# Patient Record
Sex: FEMALE | Race: OTHER | Employment: OTHER | URBAN - METROPOLITAN AREA
[De-identification: names, ages, dates, MRNs, and addresses within clinical notes are randomized per-mention and may not be internally consistent; named-entity substitution may affect disease eponyms.]

---

## 2019-07-01 ENCOUNTER — NEW PATIENT COMPREHENSIVE (OUTPATIENT)
Dept: URBAN - METROPOLITAN AREA CLINIC 47 | Facility: CLINIC | Age: 54
End: 2019-07-01

## 2019-07-01 DIAGNOSIS — H25.13: ICD-10-CM

## 2019-07-01 DIAGNOSIS — H52.7: ICD-10-CM

## 2019-07-01 PROCEDURE — 92004 COMPRE OPH EXAM NEW PT 1/>: CPT

## 2019-07-01 PROCEDURE — 92015 DETERMINE REFRACTIVE STATE: CPT

## 2019-07-01 ASSESSMENT — VISUAL ACUITY
OD_SC: 20/200
OD_SC: >J12
OS_CC: J12
OD_CC: J12
OS_SC: 20/200
OS_SC: >J12
OS_CC: 20/30-1
OD_CC: 20/30

## 2019-07-01 ASSESSMENT — TONOMETRY
OD_IOP_MMHG: 15
OS_IOP_MMHG: 15

## 2021-08-16 NOTE — PATIENT DISCUSSION
Recommend Mini lower lift, post-tragel, SMAS (discussed risks and benefits of sx. ..). avoid cosmetic sx pt has cancer.

## 2021-08-16 NOTE — PATIENT DISCUSSION
Rx Restasis bid ou,  Reviewed with patient.  Samples given.  Patient can get Klarity C bid if no coverage by insurance for [facility] also.

## 2021-08-23 NOTE — PATIENT DISCUSSION
Rx Restasis bid ou,  Reviewed with patient.  Samples given.  Patient can get Klarity C bid if no coverage by insurance for [facility] also.

## 2022-02-02 NOTE — PATIENT DISCUSSION
Refilled Rx of  Restasis bid ou,  Reviewed with patient.  Samples given.  Patient can get Klarity C bid if no coverage by insurance for [facility] also.

## 2022-06-10 NOTE — PATIENT DISCUSSION
Refilled Rx of  Restasis bid ou,  Reviewed with patient.  Samples given.  Patient can get Klarity C bid if no coverage by insurance for [facility] also.

## 2022-08-29 NOTE — PATIENT DISCUSSION
Refilled Rx of  Restasis bid ou,  Reviewed with patient.  Samples given.  Patient can get Klarity C bid if no coverage by insurance for [facility] also.

## 2022-09-07 NOTE — PATIENT DISCUSSION
Refilled Rx of  Restasis bid ou,  Reviewed with patient.  Samples given.  Patient can get Klarity C bid if no coverage by insurance for [facility] also.

## 2022-09-19 NOTE — PATIENT DISCUSSION
***CT reviewed with JPF; JPF recommends proceeding with upper bleph surgery and WILL SEND TISSUE TO PATHOLOGY***.

## 2022-09-19 NOTE — PATIENT DISCUSSION
No primary lesion seen on exam.  Swelling on upper lid.  Cysts on left frontal scalp, left postauricular. CT scan of orbital recommended.  [place] [facility] .

## 2022-09-19 NOTE — PATIENT DISCUSSION
Refilled Rx of  Restasis bid ou,  Reviewed with patient.  Samples given.  Patient can get Klarity C bid if no coverage by insurance for [facility] also.

## 2022-09-19 NOTE — PATIENT DISCUSSION
*Confirmed with JPF, if present on day of BUL bleph surgery, he will biopsy at that time. 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

## 2022-09-26 ENCOUNTER — APPOINTMENT (RX ONLY)
Dept: URBAN - METROPOLITAN AREA CLINIC 153 | Facility: CLINIC | Age: 57
Setting detail: DERMATOLOGY
End: 2022-09-26

## 2022-09-26 DIAGNOSIS — Z71.89 OTHER SPECIFIED COUNSELING: ICD-10-CM

## 2022-09-26 DIAGNOSIS — D22 MELANOCYTIC NEVI: ICD-10-CM

## 2022-09-26 DIAGNOSIS — L82.1 OTHER SEBORRHEIC KERATOSIS: ICD-10-CM

## 2022-09-26 DIAGNOSIS — L81.4 OTHER MELANIN HYPERPIGMENTATION: ICD-10-CM

## 2022-09-26 DIAGNOSIS — D18.0 HEMANGIOMA: ICD-10-CM

## 2022-09-26 DIAGNOSIS — L85.3 XEROSIS CUTIS: ICD-10-CM | Status: INADEQUATELY CONTROLLED

## 2022-09-26 PROBLEM — D48.5 NEOPLASM OF UNCERTAIN BEHAVIOR OF SKIN: Status: ACTIVE | Noted: 2022-09-26

## 2022-09-26 PROBLEM — D18.01 HEMANGIOMA OF SKIN AND SUBCUTANEOUS TISSUE: Status: ACTIVE | Noted: 2022-09-26

## 2022-09-26 PROBLEM — D22.5 MELANOCYTIC NEVI OF TRUNK: Status: ACTIVE | Noted: 2022-09-26

## 2022-09-26 PROCEDURE — 11102 TANGNTL BX SKIN SINGLE LES: CPT

## 2022-09-26 PROCEDURE — ? TREATMENT REGIMEN

## 2022-09-26 PROCEDURE — ? ADDITIONAL NOTES

## 2022-09-26 PROCEDURE — 99203 OFFICE O/P NEW LOW 30 MIN: CPT | Mod: 25

## 2022-09-26 PROCEDURE — 11103 TANGNTL BX SKIN EA SEP/ADDL: CPT

## 2022-09-26 PROCEDURE — ? BIOPSY BY SHAVE METHOD

## 2022-09-26 PROCEDURE — ? COUNSELING

## 2022-09-26 ASSESSMENT — LOCATION DETAILED DESCRIPTION DERM
LOCATION DETAILED: SUPERIOR LUMBAR SPINE
LOCATION DETAILED: INFERIOR THORACIC SPINE
LOCATION DETAILED: RIGHT INFERIOR UPPER BACK
LOCATION DETAILED: LEFT INFERIOR UPPER BACK
LOCATION DETAILED: EPIGASTRIC SKIN

## 2022-09-26 ASSESSMENT — LOCATION SIMPLE DESCRIPTION DERM
LOCATION SIMPLE: LEFT UPPER BACK
LOCATION SIMPLE: LOWER BACK
LOCATION SIMPLE: ABDOMEN
LOCATION SIMPLE: RIGHT UPPER BACK
LOCATION SIMPLE: UPPER BACK

## 2022-09-26 ASSESSMENT — LOCATION ZONE DERM: LOCATION ZONE: TRUNK

## 2022-09-26 NOTE — PROCEDURE: MIPS QUALITY
Quality 130: Documentation Of Current Medications In The Medical Record: Current Medications Documented
Quality 110: Preventive Care And Screening: Influenza Immunization: Influenza Immunization Administered during Influenza season
Detail Level: Detailed
Quality 400a: One-Time Screening For Hepatitis C Virus (Hcv) For All Patients: Patient received one-time screening for HCV infection

## 2022-09-26 NOTE — PROCEDURE: BIOPSY BY SHAVE METHOD
Body Location Override (Optional - Billing Will Still Be Based On Selected Body Map Location If Applicable): right medial inferior superior chest
Detail Level: Detailed
Depth Of Biopsy: dermis
Was A Bandage Applied: Yes
Size Of Lesion In Cm: 0.8
Biopsy Type: H and E
Biopsy Method: Personna blade
Anesthesia Type: 1% lidocaine with epinephrine
Anesthesia Volume In Cc (Will Not Render If 0): 0.5
Additional Anesthesia Volume In Cc (Will Not Render If 0): 0
Hemostasis: Drysol
Wound Care: Petrolatum
Dressing: bandage
Destruction After The Procedure: No
Type Of Destruction Used: Curettage
Curettage Text: The wound bed was treated with curettage after the biopsy was performed.
Cryotherapy Text: The wound bed was treated with cryotherapy after the biopsy was performed.
Electrodesiccation Text: The wound bed was treated with electrodesiccation after the biopsy was performed.
Electrodesiccation And Curettage Text: The wound bed was treated with electrodesiccation and curettage after the biopsy was performed.
Silver Nitrate Text: The wound bed was treated with silver nitrate after the biopsy was performed.
Consent: Written consent was obtained and risks were reviewed including but not limited to scarring, infection, bleeding, scabbing, incomplete removal, nerve damage and allergy to anesthesia.
Post-Care Instructions: I reviewed with the patient in detail post-care instructions. Patient is to keep the biopsy site dry overnight, and then apply bacitracin twice daily until healed. Patient may apply hydrogen peroxide soaks to remove any crusting.
Notification Instructions: Patient will be notified of biopsy results. However, patient instructed to call the office if not contacted within 2 weeks.
Billing Type: Third-Party Bill
Information: Selecting Yes will display possible errors in your note based on the variables you have selected. This validation is only offered as a suggestion for you. PLEASE NOTE THAT THE VALIDATION TEXT WILL BE REMOVED WHEN YOU FINALIZE YOUR NOTE. IF YOU WANT TO FAX A PRELIMINARY NOTE YOU WILL NEED TO TOGGLE THIS TO 'NO' IF YOU DO NOT WANT IT IN YOUR FAXED NOTE.
Body Location Override (Optional - Billing Will Still Be Based On Selected Body Map Location If Applicable): right medial superior chest

## 2022-10-07 ENCOUNTER — APPOINTMENT (RX ONLY)
Dept: URBAN - METROPOLITAN AREA CLINIC 153 | Facility: CLINIC | Age: 57
Setting detail: DERMATOLOGY
End: 2022-10-07

## 2022-10-07 DIAGNOSIS — L57.0 ACTINIC KERATOSIS: ICD-10-CM

## 2022-10-07 PROCEDURE — 17000 DESTRUCT PREMALG LESION: CPT

## 2022-10-07 PROCEDURE — ? LIQUID NITROGEN

## 2022-10-07 PROCEDURE — 17003 DESTRUCT PREMALG LES 2-14: CPT

## 2022-10-07 ASSESSMENT — LOCATION ZONE DERM: LOCATION ZONE: TRUNK

## 2022-10-07 ASSESSMENT — LOCATION SIMPLE DESCRIPTION DERM
LOCATION SIMPLE: CHEST
LOCATION SIMPLE: LEFT CLAVICULAR SKIN

## 2022-10-07 ASSESSMENT — LOCATION DETAILED DESCRIPTION DERM
LOCATION DETAILED: LEFT CLAVICULAR SKIN
LOCATION DETAILED: RIGHT MEDIAL SUPERIOR CHEST

## 2022-10-07 NOTE — PROCEDURE: LIQUID NITROGEN
Render Post-Care Instructions In Note?: no
Duration Of Freeze Thaw-Cycle (Seconds): 3
Show Aperture Variable?: Yes
Number Of Freeze-Thaw Cycles: 1 freeze-thaw cycle
Detail Level: Detailed
Consent: The patient's consent was obtained including but not limited to risks of crusting, scabbing, blistering, scarring, darker or lighter pigmentary change, recurrence, incomplete removal and infection.
Post-Care Instructions: I reviewed with the patient in detail post-care instructions. Patient is to wear sunprotection, and avoid picking at any of the treated lesions. Pt may apply Vaseline to crusted or scabbing areas.

## 2022-10-17 NOTE — PATIENT DISCUSSION
Refilled Rx of  Restasis bid ou,  Reviewed with patient.  Samples given.  Patient can get Klarity C bid if no coverage by insurance for [facility] also.

## 2023-01-18 NOTE — PATIENT DISCUSSION
1/30/23. After a long discussion JPF stated to put the BUL Bleph sx off until she has the [place] 9 done due to pts history of lymphoma.

## 2023-01-18 NOTE — PATIENT DISCUSSION
Recommended excision/biopsy due to patient discomfort/suspicious appearance.  Surgery 45 minutes including BUL Bleph.

## 2023-01-18 NOTE — PATIENT DISCUSSION
1/30/23 Per JPF pt to have bx done first to prioritize this sx first due to pts history of lymphoma.

## 2023-01-18 NOTE — PATIENT DISCUSSION
Recommend Bilateral upper lid blepharoplasty. Medicare (discussed risks and benefits of sx. ..). Also Discussed Cosmetic Upper Blepharoplasty, gave quote to patient, she refused to sign quote, patient has decided to only proceed with what insurance covers.

## 2023-01-23 NOTE — PATIENT DISCUSSION
Refilled Rx of  Restasis bid ou,  Reviewed with patient.  Samples given.  Patient can get Klarity C bid if no coverage by insurance for [facility] also.

## 2023-01-23 NOTE — PATIENT DISCUSSION
No primary lesion seen on exam.  Swelling on upper lid.  Cysts on left frontal scalp, left postauricular. CT scan of orbital recommended.  [facility] .

## 2023-01-30 NOTE — PATIENT DISCUSSION
No primary lesion seen on exam.  Swelling on upper lid.  Cysts on left frontal scalp, left postauricular. CT scan of orbital recommended.  [place] [facility] specialist . Statement Selected Statement Selected Statement Selected Statement Selected Statement Selected Statement Selected Statement Selected Statement Selected Statement Selected Statement Selected Statement Selected Statement Selected

## 2023-01-30 NOTE — PATIENT DISCUSSION
Refilled Rx of  Restasis bid ou,  Reviewed with patient.  Samples given.  Patient can get Klarity C bid if no coverage by insurance for [facility] also.

## 2023-01-30 NOTE — PATIENT DISCUSSION
Refilled Rx of  Restasis bid ou,  Reviewed with patient.  Samples given.  Patient can get Klarity C bid if no coverage by insurance for [facility] also.
